# Patient Record
Sex: MALE | Race: BLACK OR AFRICAN AMERICAN | Employment: UNEMPLOYED | URBAN - METROPOLITAN AREA
[De-identification: names, ages, dates, MRNs, and addresses within clinical notes are randomized per-mention and may not be internally consistent; named-entity substitution may affect disease eponyms.]

---

## 2019-10-25 ENCOUNTER — HOSPITAL ENCOUNTER (EMERGENCY)
Age: 24
Discharge: HOME | End: 2019-10-25
Attending: EMERGENCY MEDICINE
Payer: COMMERCIAL

## 2019-10-25 VITALS
TEMPERATURE: 98.7 F | HEART RATE: 86 BPM | DIASTOLIC BLOOD PRESSURE: 88 MMHG | RESPIRATION RATE: 16 BRPM | WEIGHT: 126 LBS | SYSTOLIC BLOOD PRESSURE: 133 MMHG | BODY MASS INDEX: 23.05 KG/M2

## 2019-10-25 DIAGNOSIS — R03.0 ELEVATED BLOOD PRESSURE READING: ICD-10-CM

## 2019-10-25 DIAGNOSIS — R10.9 RIGHT FLANK PAIN: Primary | ICD-10-CM

## 2019-10-25 PROCEDURE — 99281 EMR DPT VST MAYX REQ PHY/QHP: CPT

## 2019-10-25 RX ORDER — IBUPROFEN 800 MG/1
800 TABLET ORAL EVERY 8 HOURS
Qty: 15 TAB | Refills: 0 | Status: SHIPPED | OUTPATIENT
Start: 2019-10-25 | End: 2019-10-30

## 2019-10-25 RX ORDER — METAXALONE 800 MG/1
800 TABLET ORAL 4 TIMES DAILY
Qty: 20 TAB | Refills: 0 | Status: SHIPPED | OUTPATIENT
Start: 2019-10-25 | End: 2019-10-30

## 2019-10-25 RX ORDER — LIDOCAINE HCL 4 G/100G
CREAM TOPICAL
Qty: 1 TUBE | Refills: 0 | Status: SHIPPED | OUTPATIENT
Start: 2019-10-25

## 2019-10-25 NOTE — DISCHARGE INSTRUCTIONS
Patient Education        Elevated Blood Pressure: Care Instructions  Your Care Instructions    Blood pressure is a measure of how hard the blood pushes against the walls of your arteries. It's normal for blood pressure to go up and down throughout the day. But if it stays up over time, you have high blood pressure. Two numbers tell you your blood pressure. The first number is the systolic pressure. It shows how hard the blood pushes when your heart is pumping. The second number is the diastolic pressure. It shows how hard the blood pushes between heartbeats, when your heart is relaxed and filling with blood. An ideal blood pressure in adults is less than 120/80 (say \"120 over 80\"). High blood pressure is 140/90 or higher. You have high blood pressure if your top number is 140 or higher or your bottom number is 90 or higher, or both. The main test for high blood pressure is simple, fast, and painless. To diagnose high blood pressure, your doctor will test your blood pressure at different times. After testing your blood pressure, your doctor may ask you to test it again when you are home. If you are diagnosed with high blood pressure, you can work with your doctor to make a long-term plan to manage it. Follow-up care is a key part of your treatment and safety. Be sure to make and go to all appointments, and call your doctor if you are having problems. It's also a good idea to know your test results and keep a list of the medicines you take. How can you care for yourself at home? · Do not smoke. Smoking increases your risk for heart attack and stroke. If you need help quitting, talk to your doctor about stop-smoking programs and medicines. These can increase your chances of quitting for good. · Stay at a healthy weight. · Try to limit how much sodium you eat to less than 2,300 milligrams (mg) a day. Your doctor may ask you to try to eat less than 1,500 mg a day. · Be physically active.  Get at least 30 minutes of exercise on most days of the week. Walking is a good choice. You also may want to do other activities, such as running, swimming, cycling, or playing tennis or team sports. · Avoid or limit alcohol. Talk to your doctor about whether you can drink any alcohol. · Eat plenty of fruits, vegetables, and low-fat dairy products. Eat less saturated and total fats. · Learn how to check your blood pressure at home. When should you call for help? Call your doctor now or seek immediate medical care if:  ? · Your blood pressure is much higher than normal (such as 180/110 or higher). ? · You think high blood pressure is causing symptoms such as:  ¨ Severe headache. ¨ Blurry vision. ? Watch closely for changes in your health, and be sure to contact your doctor if:  ? · You do not get better as expected. Where can you learn more? Go to http://daniel-uriel.info/. Enter T644 in the search box to learn more about \"Elevated Blood Pressure: Care Instructions. \"  Current as of: September 21, 2016  Content Version: 11.4  © 8783-7644 [employer]. Care instructions adapted under license by [employer] (which disclaims liability or warranty for this information). If you have questions about a medical condition or this instruction, always ask your healthcare professional. [facility] any warranty or liability for your use of this information.

## 2019-10-25 NOTE — ED TRIAGE NOTES
\"For the past 2 months my back has been hurting me. I went to [facility] and they did a physical. They checked my kidneys and said it was ok. My back is still hurting me and now occasionally the pain goes to my upper back and right arm. \"

## 2019-10-25 NOTE — ED PROVIDER NOTES
EMERGENCY DEPARTMENT HISTORY AND PHYSICAL EXAM    Date: 10/25/2019  Patient Name: Abundio Watkins    History of Presenting Illness     Chief Complaint   Patient presents with    Back Pain         History Provided By: Patient    Additional History (Context): Abundio Watkins is a 21 y.o. male with HIV who presents with persistent intermittent right flank pain now for 3 weeks. Went to [facility] when it first began was told that he is kidney function was good. Denies any shortness of breath fever chest pain or abdominal pain. Pain onset not positional.  Feels 'swollen' in affected area. PCP: None        Past History     Past Medical History:  Past Medical History:   Diagnosis Date    Astigmatism     HIV (human immunodeficiency virus infection) ([facility] [address]     Syphilis        Past Surgical History:  No past surgical history on file. Family History:  No family history on file. Social History:  Social History     Tobacco Use    Smoking status: Never Smoker   Substance Use Topics    Alcohol use: No    Drug use: No       Allergies:  No Known Allergies      Review of Systems   Review of Systems   Constitutional: Negative for fever. Respiratory: Negative for cough and shortness of breath. Cardiovascular: Negative for chest pain. Gastrointestinal: Negative for abdominal pain. Genitourinary: Positive for flank pain. Negative for dysuria and hematuria. All Other Systems Negative  Physical Exam     Vitals:    10/25/19 1606 10/25/19 1715   BP: (!) 149/105 133/88   Pulse: 86    Resp: 16    Temp: 98.7 °F (37.1 °C)    Weight: 57.2 kg (126 lb)      Physical Exam   Constitutional: Vital signs are normal. He appears well-developed and well-nourished. He is active. Non-toxic appearance. He does not appear ill. No distress. HENT:   Head: Normocephalic and atraumatic. Neck: Normal range of motion. Neck supple. Carotid bruit is not present. No tracheal deviation present. No thyromegaly present.    Cardiovascular: Normal rate, regular rhythm and normal heart sounds. Exam reveals no gallop and no friction rub. No murmur heard. Pulmonary/Chest: Effort normal and breath sounds normal. No stridor. No respiratory distress. He has no wheezes. He has no rales. He exhibits no tenderness. Abdominal: Soft. He exhibits no distension and no mass. There is no tenderness. There is no rebound, no guarding and no CVA tenderness. Musculoskeletal: Normal range of motion. He exhibits tenderness. Right medial CVA tenderness   Neurological: He is alert. Skin: Skin is warm, dry and intact. He is not diaphoretic. No pallor. Psychiatric: He has a normal mood and affect. His speech is normal and behavior is normal. Judgment and thought content normal.   Nursing note and vitals reviewed. Diagnostic Study Results     Labs -   No results found for this or any previous visit (from the past 12 hour(s)). Radiologic Studies -   No orders to display     CT Results  (Last 48 hours)    None        CXR Results  (Last 48 hours)    None            Medical Decision Making   I am the first provider for this patient. I reviewed the vital signs, available nursing notes, past medical history, past surgical history, family history and social history. Vital Signs-Reviewed the patient's vital signs. Records Reviewed: Nursing Notes    Procedures:  Procedures    Provider Notes (Medical Decision Making): has no h/o HTN; BP elevated with ? MILTON, hydronephrosis. Patient now saying that he needs to leave and he will follow-up with an outpatient referral.  Encouraged him for 1 of the more concerning diagnoses and will cancel the ultrasound now. As well as his blood and urine. Patient understands the risk involved with possible hydronephrosis or ischemia to his kidney. MED RECONCILIATION:  No current facility-administered medications for this encounter.       Current Outpatient Medications   Medication Sig    ibuprofen (MOTRIN) 800 mg tablet Take 1 Tab by mouth every eight (8) hours for 5 days.  metaxalone (SKELAXIN) 800 mg tablet Take 1 Tab by mouth four (4) times daily for 5 days.  lidocaine (XYLOCAINE) 4 % topical cream Apply  to affected area three (3) times daily as needed for Pain. Disposition:  home    DISCHARGE NOTE:   5:24 PM    Pt has been reexamined. Patient has no new complaints, changes, or physical findings. Care plan outlined and precautions discussed. Results of exam were reviewed with the patient. All medications were reviewed with the patient; will d/c home with see below. All of pt's questions and concerns were addressed. Patient was instructed and agrees to follow up with PCP, as well as to return to the ED upon further deterioration. Patient is ready to go home. Follow-up Information     Follow up With Specialties Details Why Contact Info    Smiley Griffin MD Internal Medicine Schedule an appointment as soon as possible for a visit in 3 days  [address] [place] [address] [address] [place] [address] [place]      [facility] EMERGENCY DEPT Emergency Medicine  If symptoms worsen return immediately [address] [place]  [phone]          Current Discharge Medication List      START taking these medications    Details   ibuprofen (MOTRIN) 800 mg tablet Take 1 Tab by mouth every eight (8) hours for 5 days. Qty: 15 Tab, Refills: 0      metaxalone (SKELAXIN) 800 mg tablet Take 1 Tab by mouth four (4) times daily for 5 days. Qty: 20 Tab, Refills: 0      lidocaine (XYLOCAINE) 4 % topical cream Apply  to affected area three (3) times daily as needed for Pain. Qty: 1 Tube, Refills: 0             Diagnosis     Clinical Impression:   1. Right flank pain    2.  Elevated blood pressure reading

## 2022-09-15 ENCOUNTER — HOSPITAL ENCOUNTER (EMERGENCY)
Age: 27
Discharge: HOME | End: 2022-09-15
Attending: EMERGENCY MEDICINE
Payer: COMMERCIAL

## 2022-09-15 VITALS
HEART RATE: 107 BPM | TEMPERATURE: 98.2 F | SYSTOLIC BLOOD PRESSURE: 137 MMHG | DIASTOLIC BLOOD PRESSURE: 95 MMHG | RESPIRATION RATE: 18 BRPM | OXYGEN SATURATION: 100 %

## 2022-09-15 DIAGNOSIS — N50.9 SCROTAL LESION: Primary | ICD-10-CM

## 2022-09-15 LAB
APPEARANCE UR: CLEAR
BACTERIA URNS QL MICRO: NEGATIVE /HPF
BILIRUB UR QL: NEGATIVE
COLOR UR: ABNORMAL
EPITH CASTS URNS QL MICRO: ABNORMAL /LPF (ref 0–5)
GLUCOSE UR STRIP.AUTO-MCNC: NEGATIVE MG/DL
HGB UR QL STRIP: NEGATIVE
KETONES UR QL STRIP.AUTO: 40 MG/DL
LEUKOCYTE ESTERASE UR QL STRIP.AUTO: NEGATIVE
MUCOUS THREADS URNS QL MICRO: ABNORMAL /LPF
NITRITE UR QL STRIP.AUTO: NEGATIVE
PH UR STRIP: 5.5 [PH] (ref 5–8)
PROT UR STRIP-MCNC: ABNORMAL MG/DL
RBC #/AREA URNS HPF: NEGATIVE /HPF (ref 0–5)
SP GR UR REFRACTOMETRY: >1.03 (ref 1–1.03)
UROBILINOGEN UR QL STRIP.AUTO: 1 EU/DL (ref 0.2–1)
WBC URNS QL MICRO: ABNORMAL /HPF (ref 0–4)

## 2022-09-15 PROCEDURE — 87491 CHLMYD TRACH DNA AMP PROBE: CPT

## 2022-09-15 PROCEDURE — 87661 TRICHOMONAS VAGINALIS AMPLIF: CPT

## 2022-09-15 PROCEDURE — 86780 TREPONEMA PALLIDUM: CPT

## 2022-09-15 PROCEDURE — 87255 GENET VIRUS ISOLATE HSV: CPT

## 2022-09-15 PROCEDURE — 86592 SYPHILIS TEST NON-TREP QUAL: CPT

## 2022-09-15 PROCEDURE — 81001 URINALYSIS AUTO W/SCOPE: CPT

## 2022-09-15 PROCEDURE — 99283 EMERGENCY DEPT VISIT LOW MDM: CPT

## 2022-09-15 RX ORDER — ACYCLOVIR 400 MG/1
400 TABLET ORAL
Qty: 35 TABLET | Refills: 0 | Status: SHIPPED | OUTPATIENT
Start: 2022-09-15 | End: 2022-09-22

## 2022-09-15 NOTE — ED PROVIDER NOTES
EMERGENCY DEPARTMENT HISTORY AND PHYSICAL EXAM    Date: 9/15/2022  Patient Name: Sierra Falcon    History of Presenting Illness     Chief Complaint   Patient presents with    Rash     To groin area         History Provided By: Patient    Chief Complaint: Scrotal pain  Duration: Couple days  Timing: Acute  Location: Posterior scrotum  Quality: Sore  Severity: Moderate  Modifying Factors: Somewhat worse when sitting  Associated Symptoms: none       Additional History (Context): Sierra Falcon is a 32 y.o. adult with a history of syphilis and HIV who presents today for issues listed above. Patient reports he goes to the LGBT clinic in [place] and has been compliant with all of his antivirals. Patient reports he first noticed some discomfort yesterday but is currently homeless and did not have the means to evaluate the area himself. Has not tried anything for this at home. Reports he has sex with men. PCP: None    Current Outpatient Medications   Medication Sig Dispense Refill    acyclovir (ZOVIRAX) 400 mg tablet Take 1 Tablet by mouth every four (4) hours (while awake) for 7 days. 35 Tablet 0    lidocaine (XYLOCAINE) 4 % topical cream Apply  to affected area three (3) times daily as needed for Pain. 1 Tube 0       Past History     Past Medical History:  Past Medical History:   Diagnosis Date    Astigmatism     HIV (human immunodeficiency virus infection) ([facility] [address]     Syphilis        Past Surgical History:  No past surgical history on file. Family History:  No family history on file. Social History:  Social History     Tobacco Use    Smoking status: Never   Substance Use Topics    Alcohol use: No    Drug use: No       Allergies:  No Known Allergies      Review of Systems   Review of Systems   Constitutional:  Negative for chills and fever. HENT:  Negative for congestion, rhinorrhea and sore throat. Respiratory:  Negative for cough and shortness of breath.     Cardiovascular:  Negative for chest pain. Gastrointestinal:  Negative for abdominal pain, blood in stool, constipation, diarrhea, nausea and vomiting. Genitourinary:  Negative for dysuria, frequency and hematuria. Scrotal pain     Musculoskeletal:  Negative for back pain and myalgias. Skin:  Negative for rash and wound. Neurological:  Negative for dizziness and headaches. All other systems reviewed and are negative. All Other Systems Negative  Physical Exam     Vitals:    09/15/22 1714   BP: (!) 137/95   Pulse: (!) 107   Resp: 18   Temp: 98.2 °F (36.8 °C)   SpO2: 100%     Physical Exam  Vitals and nursing note reviewed. Constitutional:       General: She is not in acute distress. Appearance: She is well-developed. She is not diaphoretic. HENT:      Head: Normocephalic and atraumatic. Eyes:      Conjunctiva/sclera: Conjunctivae normal.   Cardiovascular:      Rate and Rhythm: Normal rate and regular rhythm. Heart sounds: Normal heart sounds. Pulmonary:      Effort: Pulmonary effort is normal. No respiratory distress. Breath sounds: Normal breath sounds. Chest:      Chest wall: No tenderness. Abdominal:      General: Bowel sounds are normal. There is no distension. Palpations: Abdomen is soft. Tenderness: There is no abdominal tenderness. There is no guarding or rebound. Hernia: There is no hernia in the left inguinal area or right inguinal area. Genitourinary:     Pubic Area: No rash. Penis: Normal.       Testes: Normal.      Comments: Two shallow ulcers noted to the poster scrotum, no chancre, testicular pain, testicular swelling or penile discharge   Musculoskeletal:         General: No deformity. Normal range of motion. Cervical back: Normal range of motion and neck supple. Skin:     General: Skin is warm and dry. Neurological:      Mental Status: She is alert and oriented to person, place, and time.          Diagnostic Study Results     Labs -   No results found for this or any previous visit (from the past 12 hour(s)). Radiologic Studies -   No orders to display     CT Results  (Last 48 hours)      None          CXR Results  (Last 48 hours)      None              Medical Decision Making   I am the first provider for this patient. I reviewed the vital signs, available nursing notes, past medical history, past surgical history, family history and social history. Vital Signs-Reviewed the patient's vital signs. Records Reviewed: Nursing Notes and Old Medical Records     Procedures: None   Procedures    Provider Notes (Medical Decision Making):     Differential: HSV, syphilis, gonorrhea, chlamydia, trichomonas    Plan: Have discussed with patient history and physical exam most consistent with herpes. Will swab for herpes and out of precaution will order syphilis screening. Have encouraged patient to continue being compliant with his antivirals and to practice safer sex. Patient states understanding. Will discharge home. MED RECONCILIATION:  No current facility-administered medications for this encounter. Current Outpatient Medications   Medication Sig    acyclovir (ZOVIRAX) 400 mg tablet Take 1 Tablet by mouth every four (4) hours (while awake) for 7 days.  lidocaine (XYLOCAINE) 4 % topical cream Apply  to affected area three (3) times daily as needed for Pain. Disposition:  Home     DISCHARGE NOTE:   Pt has been reexamined. Patient has no new complaints, changes, or physical findings. Care plan outlined and precautions discussed. Results of workup were reviewed with the patient. All medications were reviewed with the patient. All of pt's questions and concerns were addressed. Patient was instructed and agrees to follow up with PCP/[facility] as well as to return to the ED upon further deterioration. Patient is ready to go home.     Follow-up Information       Follow up With Specialties Details Why [address] [facility] EMERGENCY DEPT Emergency Medicine  As needed [address] [address]  [address] [facility] [address] [place]  Schedule an appointment as soon as possible for a visit   [address]  [address] [place]  [address] [phone]            Current Discharge Medication List        START taking these medications    Details   acyclovir (ZOVIRAX) 400 mg tablet Take 1 Tablet by mouth every four (4) hours (while awake) for 7 days. Qty: 35 Tablet, Refills: 0  Start date: 9/15/2022, End date: 9/22/2022                 Diagnosis     Clinical Impression:   1. Scrotal lesion          \"Please note that this dictation was completed with [employer], the computer voice recognition software. Quite often unanticipated grammatical, syntax, homophones, and other interpretive errors are inadvertently transcribed by the computer software. Please disregard these errors. Please excuse any errors that have escaped final proofreading. \"

## 2022-09-15 NOTE — ED TRIAGE NOTES
Per patient noted today pain and \"scaly\" feeling behind testicles, denies pain to testicles. Denies pain with urination.  Will obtain urine sample

## 2022-09-16 LAB
C TRACH RRNA SPEC QL NAA+PROBE: NEGATIVE
N GONORRHOEA RRNA SPEC QL NAA+PROBE: NEGATIVE
RPR SER QL: NONREACTIVE
SPECIMEN SOURCE: NORMAL
T PALLIDUM AB SER QL IA: ABNORMAL

## 2022-09-18 LAB
HSV SPEC CULT: NORMAL
SPECIMEN SOURCE: NORMAL

## 2022-09-19 LAB — T PALLIDUM AB SER QL IF: REACTIVE

## 2022-09-20 NOTE — ED NOTES
Attempted to call patient to discuss syphilis screening results however he did not answer. Did leave a HIPAA compliant voicemail. Did call the patient's emergency contact and discussed the extreme importance that he return to the emergency department for treatment.

## 2022-09-21 LAB
SPECIMEN SOURCE: NORMAL
T VAGINALIS RRNA SPEC QL NAA+PROBE: NEGATIVE

## 2022-09-23 ENCOUNTER — HOSPITAL ENCOUNTER (EMERGENCY)
Age: 27
Discharge: STILL ADMITTED OR ADMINISTRATIVE | End: 2022-09-23

## 2022-09-23 NOTE — ED NOTES
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 After discussion with the patient and reviewing the results with him he notes that he remotely had a history of syphilis. 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 Given this and his history there is no treatment is needed. I had asked him initially to check-in however he does not require further treatment and have encouraged him to follow closely with his primary doctor at the [facility]. And return if at all worsen or concern.   Nithya Whatley, DO 4:14 PM

## 2022-09-27 NOTE — ED NOTES
1500: Spoke with Health Department, who reports positive syphilis result and they were following up with treatment. Pt was not treated in ED. Will reach out to patient and keep health department updated. 1513: Called the only number listed for patient. Straight to voicemail. Left HIPAA compliant message. Spoke with patient's mom, who is pt's emergency contact. Provided no information but discussed importance of prompt return call. She states she will relay message. 1712: Again spoke with Marti Warren from health department and informed him that provider is unable to get in touch with patient. He does not recommend contacting nonemergent PD for wellness check at this time, but states that the health department will follow ensure that patient is treated.